# Patient Record
Sex: MALE | Race: WHITE | Employment: OTHER | URBAN - METROPOLITAN AREA
[De-identification: names, ages, dates, MRNs, and addresses within clinical notes are randomized per-mention and may not be internally consistent; named-entity substitution may affect disease eponyms.]

---

## 2017-10-18 PROBLEM — E78.5 BORDERLINE HYPERLIPIDEMIA: Status: ACTIVE | Noted: 2017-10-18

## 2017-10-18 PROBLEM — D48.9 NEOPLASM OF UNCERTAIN BEHAVIOR: Status: ACTIVE | Noted: 2017-10-18

## 2017-10-18 PROBLEM — R35.1 BPH ASSOCIATED WITH NOCTURIA: Status: ACTIVE | Noted: 2017-10-18

## 2017-10-18 PROBLEM — N40.1 BPH ASSOCIATED WITH NOCTURIA: Status: ACTIVE | Noted: 2017-10-18

## 2017-10-18 PROBLEM — M25.672 ANKLE JOINT STIFFNESS, LEFT: Status: ACTIVE | Noted: 2017-10-18

## 2017-10-18 PROBLEM — R97.20 ELEVATED PSA: Status: ACTIVE | Noted: 2017-10-18

## 2018-04-18 PROBLEM — R35.1 BPH ASSOCIATED WITH NOCTURIA: Status: RESOLVED | Noted: 2017-10-18 | Resolved: 2018-04-18

## 2018-04-18 PROBLEM — N40.1 BPH ASSOCIATED WITH NOCTURIA: Status: RESOLVED | Noted: 2017-10-18 | Resolved: 2018-04-18

## 2018-04-18 PROBLEM — D48.9 NEOPLASM OF UNCERTAIN BEHAVIOR: Status: RESOLVED | Noted: 2017-10-18 | Resolved: 2018-04-18

## 2018-04-18 PROBLEM — E78.5 BORDERLINE HYPERLIPIDEMIA: Status: RESOLVED | Noted: 2017-10-18 | Resolved: 2018-04-18

## 2018-04-18 PROBLEM — M25.672 ANKLE JOINT STIFFNESS, LEFT: Status: RESOLVED | Noted: 2017-10-18 | Resolved: 2018-04-18

## 2018-10-18 PROBLEM — F32.4 MAJOR DEPRESSIVE DISORDER IN PARTIAL REMISSION, UNSPECIFIED WHETHER RECURRENT (HCC): Status: ACTIVE | Noted: 2018-10-18

## 2020-08-03 PROBLEM — R97.20 ELEVATED PSA: Status: RESOLVED | Noted: 2017-10-18 | Resolved: 2020-08-03

## 2021-01-28 PROBLEM — F32.5 DEPRESSION, MAJOR, SINGLE EPISODE, COMPLETE REMISSION (HCC): Status: RESOLVED | Noted: 2021-01-28 | Resolved: 2021-01-28

## 2021-01-28 PROBLEM — F32.5 DEPRESSION, MAJOR, SINGLE EPISODE, COMPLETE REMISSION (HCC): Status: ACTIVE | Noted: 2021-01-28

## 2021-01-28 PROBLEM — I25.84 CORONARY ARTERY CALCIFICATION: Status: ACTIVE | Noted: 2021-01-28

## 2021-01-28 PROBLEM — F32.4 MAJOR DEPRESSIVE DISORDER IN PARTIAL REMISSION, UNSPECIFIED WHETHER RECURRENT (HCC): Status: RESOLVED | Noted: 2018-10-18 | Resolved: 2021-01-28

## 2021-01-28 PROBLEM — E66.09 CLASS 1 OBESITY DUE TO EXCESS CALORIES WITHOUT SERIOUS COMORBIDITY WITH BODY MASS INDEX (BMI) OF 30.0 TO 30.9 IN ADULT: Status: ACTIVE | Noted: 2021-01-28

## 2021-01-28 PROBLEM — I25.10 CORONARY ARTERY CALCIFICATION: Status: ACTIVE | Noted: 2021-01-28

## 2021-01-28 PROBLEM — R73.01 IFG (IMPAIRED FASTING GLUCOSE): Status: ACTIVE | Noted: 2021-01-28

## 2021-01-28 PROBLEM — R91.1 PULMONARY NODULE: Status: ACTIVE | Noted: 2021-01-28

## 2021-01-28 PROBLEM — N52.8 OTHER MALE ERECTILE DYSFUNCTION: Status: ACTIVE | Noted: 2021-01-28

## 2021-01-28 PROBLEM — E78.00 HYPERCHOLESTEROLEMIA: Status: ACTIVE | Noted: 2021-01-28

## 2021-01-28 PROBLEM — F32.5 MAJOR DEPRESSION IN REMISSION (HCC): Status: ACTIVE | Noted: 2018-10-18

## 2022-01-06 PROBLEM — R91.1 PULMONARY NODULE: Status: RESOLVED | Noted: 2021-01-28 | Resolved: 2022-01-06

## 2022-01-06 PROBLEM — E66.09 CLASS 1 OBESITY DUE TO EXCESS CALORIES WITHOUT SERIOUS COMORBIDITY WITH BODY MASS INDEX (BMI) OF 30.0 TO 30.9 IN ADULT: Status: RESOLVED | Noted: 2021-01-28 | Resolved: 2022-01-06

## 2023-07-25 ENCOUNTER — OFFICE VISIT (OUTPATIENT)
Dept: FAMILY MEDICINE CLINIC | Facility: CLINIC | Age: 73
End: 2023-07-25
Payer: COMMERCIAL

## 2023-07-25 VITALS
TEMPERATURE: 98 F | WEIGHT: 180 LBS | DIASTOLIC BLOOD PRESSURE: 66 MMHG | BODY MASS INDEX: 28.93 KG/M2 | OXYGEN SATURATION: 98 % | SYSTOLIC BLOOD PRESSURE: 122 MMHG | HEIGHT: 66 IN | HEART RATE: 82 BPM | RESPIRATION RATE: 18 BRPM

## 2023-07-25 DIAGNOSIS — Z11.1 ENCOUNTER FOR PPD TEST: ICD-10-CM

## 2023-07-25 DIAGNOSIS — Z02.89 ENCOUNTER FOR PHYSICAL EXAMINATION RELATED TO EMPLOYMENT: Primary | ICD-10-CM

## 2023-07-25 PROCEDURE — 86580 TB INTRADERMAL TEST: CPT | Performed by: NURSE PRACTITIONER

## 2023-07-25 PROCEDURE — 99387 INIT PM E/M NEW PAT 65+ YRS: CPT | Performed by: NURSE PRACTITIONER

## 2023-07-25 NOTE — PROGRESS NOTES
Patient presents for employment physical-  for [employer] and [employer]. Ages K-6. Questionnaire and HH complete    Discussed PMH major depression in remission per PCP note 6/22. Patient reports no medication or counseling at this time and has not had depression in 5 years. Denies suicidal thoughts or thoughts of hurting himself or anyone else. Requests tb screening- Administered to L FA. Return time given  Cleared without restrictions pending tb test.   No questions or concerns. Patient will return with forms for completion. Forms not held in [facility].    See scanned forms

## 2023-07-27 ENCOUNTER — OFFICE VISIT (OUTPATIENT)
Dept: FAMILY MEDICINE CLINIC | Facility: CLINIC | Age: 73
End: 2023-07-27

## 2023-07-27 DIAGNOSIS — Z11.1 TUBERCULIN SKIN TEST READING ENCOUNTER: Primary | ICD-10-CM

## 2023-07-27 LAB — INDURATION (): 0 MM (ref 0–11)
